# Patient Record
Sex: FEMALE | Race: WHITE | Employment: FULL TIME | URBAN - METROPOLITAN AREA
[De-identification: names, ages, dates, MRNs, and addresses within clinical notes are randomized per-mention and may not be internally consistent; named-entity substitution may affect disease eponyms.]

---

## 2024-01-29 ENCOUNTER — OFFICE VISIT (OUTPATIENT)
Age: 34
End: 2024-01-29

## 2024-01-29 VITALS — DIASTOLIC BLOOD PRESSURE: 76 MMHG | HEART RATE: 94 BPM | SYSTOLIC BLOOD PRESSURE: 119 MMHG | OXYGEN SATURATION: 99 %

## 2024-01-29 DIAGNOSIS — R07.89 LEFT-SIDED CHEST WALL PAIN: ICD-10-CM

## 2024-01-29 DIAGNOSIS — R09.1 PLEURISY: Primary | ICD-10-CM

## 2024-01-29 PROBLEM — G43.909 MIGRAINE: Status: ACTIVE | Noted: 2024-01-29

## 2024-01-29 PROBLEM — K90.0 CELIAC DISEASE: Status: ACTIVE | Noted: 2024-01-29

## 2024-01-29 RX ORDER — METHYLPREDNISOLONE 4 MG/1
TABLET ORAL
Qty: 1 KIT | Refills: 0 | Status: SHIPPED | OUTPATIENT
Start: 2024-01-29 | End: 2024-02-04

## 2024-01-29 RX ORDER — FLUTICASONE PROPIONATE 50 MCG
1 SPRAY, SUSPENSION (ML) NASAL DAILY
COMMUNITY

## 2024-01-29 RX ORDER — METHYLPREDNISOLONE 4 MG/1
TABLET ORAL
Qty: 1 KIT | Refills: 0 | Status: SHIPPED | OUTPATIENT
Start: 2024-01-29 | End: 2024-01-29

## 2024-01-29 RX ORDER — CETIRIZINE HYDROCHLORIDE 10 MG/1
10 TABLET ORAL DAILY
COMMUNITY

## 2024-01-29 NOTE — PROGRESS NOTES
Ria Mireles (:  1990) is a 33 y.o. female,New patient, here for evaluation of the following chief complaint(s):  Chest Pain (Rib pain , under left breast top of ribs , NKI , started a couple days ago )      ASSESSMENT/PLAN:    ICD-10-CM    1. Pleurisy  R09.1 XR CHEST STANDARD (2 VW)     methylPREDNISolone (MEDROL DOSEPACK) 4 MG tablet     DISCONTINUED: methylPREDNISolone (MEDROL DOSEPACK) 4 MG tablet      2. Left-sided chest wall pain  R07.89 XR CHEST STANDARD (2 VW)     methylPREDNISolone (MEDROL DOSEPACK) 4 MG tablet     DISCONTINUED: methylPREDNISolone (MEDROL DOSEPACK) 4 MG tablet            Initial xray: Negative for any abnormalities.  XR CHEST STANDARD (2 VW)   Final Result   No acute cardiopulmonary findings            Denies cough, not on oral contraceptives, left sided pleuritic pain without   Follow up with your pcp in 7 days if symptoms persist or if symptoms worsen.    SUBJECTIVE/OBJECTIVE:    History provided by:  Patient   used: No      HPI:   33 y.o. female presents with symptoms of rib pain ongoing since Rib pain that stared a few days after [place]. Denies chest pain, shortness of breath. Has taken TUMS, Omeprazole for symptoms.Flew here Saturday,  noticed on the plane the pain was more of a burning sensation and pain upon breathing in on the left side of the chest just on the bra line.    Vitals:    24 1143   BP: 119/76   Pulse: 94   SpO2: 99%       Review of Systems   Cardiovascular:  Positive for chest pain.       Physical Exam  Vitals and nursing note reviewed.   Constitutional:       Appearance: Normal appearance.   HENT:      Head: Normocephalic.      Nose: Nose normal.      Mouth/Throat:      Mouth: Mucous membranes are moist.   Eyes:      Pupils: Pupils are equal, round, and reactive to light.   Cardiovascular:      Rate and Rhythm: Normal rate and regular rhythm.      Heart sounds: Normal heart sounds.   Pulmonary: